# Patient Record
Sex: MALE | Race: WHITE | NOT HISPANIC OR LATINO | Employment: FULL TIME | URBAN - METROPOLITAN AREA
[De-identification: names, ages, dates, MRNs, and addresses within clinical notes are randomized per-mention and may not be internally consistent; named-entity substitution may affect disease eponyms.]

---

## 2022-02-24 NOTE — TELEPHONE ENCOUNTER
DIAGNOSIS: LEFT ankle pain / SELF / HP / No current imaging   APPOINTMENT DATE: 3.1.22   NOTES STATUS DETAILS

## 2022-02-28 DIAGNOSIS — M25.579 PAIN IN JOINT, ANKLE AND FOOT: Primary | ICD-10-CM

## 2022-03-01 ENCOUNTER — OFFICE VISIT (OUTPATIENT)
Dept: ORTHOPEDICS | Facility: CLINIC | Age: 27
End: 2022-03-01
Payer: COMMERCIAL

## 2022-03-01 ENCOUNTER — PRE VISIT (OUTPATIENT)
Dept: ORTHOPEDICS | Facility: CLINIC | Age: 27
End: 2022-03-01
Payer: COMMERCIAL

## 2022-03-01 ENCOUNTER — ANCILLARY PROCEDURE (OUTPATIENT)
Dept: GENERAL RADIOLOGY | Facility: CLINIC | Age: 27
End: 2022-03-01
Attending: FAMILY MEDICINE
Payer: COMMERCIAL

## 2022-03-01 VITALS — HEIGHT: 64 IN | WEIGHT: 150 LBS | BODY MASS INDEX: 25.61 KG/M2

## 2022-03-01 DIAGNOSIS — M25.579 PAIN IN JOINT, ANKLE AND FOOT: ICD-10-CM

## 2022-03-01 DIAGNOSIS — M76.72 PERONEAL TENDINITIS OF LEFT LOWER EXTREMITY: Primary | ICD-10-CM

## 2022-03-01 DIAGNOSIS — M24.272 LIGAMENTOUS LAXITY OF LEFT ANKLE: ICD-10-CM

## 2022-03-01 PROCEDURE — 99203 OFFICE O/P NEW LOW 30 MIN: CPT | Performed by: FAMILY MEDICINE

## 2022-03-01 PROCEDURE — 73610 X-RAY EXAM OF ANKLE: CPT | Mod: LT | Performed by: RADIOLOGY

## 2022-03-01 NOTE — LETTER
"  3/1/2022      RE: Ramon Donavan Berta  [address]  [address]  [place] [address]       ASSESSMENT/PLAN:    (M76.72) Peroneal tendinitis of left lower extremity  (primary encounter diagnosis)  Comment:   Plan: exam was essentially normal today except for some ATFL laxity;  pain was previously localized to lateral ligaments and peroneal tendons; handout given for strengthening/ proprioception; if his symptoms return, I will place an order for PT    (M24.272) Ligamentous laxity of left ankle  Comment:   Plan: see above    Jah Galan MD  March 1, 2022  3:42 PM        Pt is a 26 year old male here today for:     Today, the patient reports that the left ankle pain started about 2 weeks ago after n[place]ic skiing. He reports that he went [place] skiing 3 x's that week for about 1.5-2 hours at a time. Denies any acute trauma, but pain after skiing activity. He did have some initial swelling that has now improved. He has not been skiing since. He does enjoy weight lifting. He would like to ski 1-2 x's/week.   Is a novice learning traditional nordic skiing; no acute injury; thought it was muscle soreness but then was limping  +hx of previous ankle sprains on that side     Left Foot/Ankle pain:   Location: posterolateral ankle   Duration: 2 weeks   Trauma/ Fall? None   Able to walk? Yes, but pain with walking.   Swelling? improved   Bruising? No  Numbness/ Tingling? No   Weakness? None   Instability? None  Snapping/Clicking? None  Imaging? Obtained today XR   Treatment? Ace bandage, massage, rest      No past medical history on file.   No past surgical history on file.   No current outpatient medications on file.      Allergies   Allergen Reactions     Penicillins      2004-04-07      ROS:   Gen- no fevers/chills   Rheum - no morning stiffness   Derm - no rash/ redness   Neuro - no numbness, no tingling   Remainder of ROS negative.     Exam:   Ht 1.626 m (5' 4\")   Wt 68 kg (150 lb)   BMI 25.75 kg/m         L " Foot/Ankle:   Inspection: Swelling - No; Bruising - NO   Sensation: intact in peroneal, tibial, sural, and saphenous distribution   ROM: Dorsiflexion - Full; Plantarflexion - Full; Inversion -Full; Eversion - Full;    Strength: 5/5 in all motions; No Pain w/ resisted motions  Bony tenderness: Medial malleolus? - No; Lateral malleolus? - NO; Navicular? - NO; 5th Metatarsal? - NO; Other - NO  Ligaments Tenderness:- ATFL/CFL ?; Deltoid - NO; Syndesmosis - NO; LisFranc - NO  Tendons: Posterior Tibialis - NO; Peroneals - ?; Achilles - NO   Maneuvers: Anterior Drawer - pos; Talar Tilt - Neg; Squeeze - Neg; Hop - Neg; Doherty - intact      Xray ankle - 3/1/2022 at [facility] – [place]    Erich Galan MD

## 2022-03-01 NOTE — LETTER
Date:March 2, 2022      Patient was self referred, no letter generated. Do not send.        [facility] Health Information

## 2022-03-01 NOTE — PROGRESS NOTES
"ASSESSMENT/PLAN:    (M76.72) Peroneal tendinitis of left lower extremity  (primary encounter diagnosis)  Comment:   Plan: exam was essentially normal today except for some ATFL laxity;  pain was previously localized to lateral ligaments and peroneal tendons; handout given for strengthening/ proprioception; if his symptoms return, I will place an order for PT    (M24.272) Ligamentous laxity of left ankle  Comment:   Plan: see above    Jah Galan MD  March 1, 2022  3:42 PM        Pt is a 26 year old male here today for:     Today, the patient reports that the left ankle pain started about 2 weeks ago after nordic skiing. He reports that he went nordic skiing 3 x's that week for about 1.5-2 hours at a time. Denies any acute trauma, but pain after skiing activity. He did have some initial swelling that has now improved. He has not been skiing since. He does enjoy weight lifting. He would like to ski 1-2 x's/week.   Is a novice learning traditional nordic skiing; no acute injury; thought it was muscle soreness but then was limping  +hx of previous ankle sprains on that side     Left Foot/Ankle pain:   Location: posterolateral ankle   Duration: 2 weeks   Trauma/ Fall? None   Able to walk? Yes, but pain with walking.   Swelling? improved   Bruising? No  Numbness/ Tingling? No   Weakness? None   Instability? None  Snapping/Clicking? None  Imaging? Obtained today XR   Treatment? Ace bandage, massage, rest      No past medical history on file.   No past surgical history on file.   No current outpatient medications on file.      Allergies   Allergen Reactions     Penicillins      2004-04-07      ROS:   Gen- no fevers/chills   Rheum - no morning stiffness   Derm - no rash/ redness   Neuro - no numbness, no tingling   Remainder of ROS negative.     Exam:   Ht 1.626 m (5' 4\")   Wt 68 kg (150 lb)   BMI 25.75 kg/m         L Foot/Ankle:   Inspection: Swelling - No; Bruising - NO   Sensation: intact in peroneal, tibial, sural, " and saphenous distribution   ROM: Dorsiflexion - Full; Plantarflexion - Full; Inversion -Full; Eversion - Full;    Strength: 5/5 in all motions; No Pain w/ resisted motions  Bony tenderness: Medial malleolus? - No; Lateral malleolus? - NO; Navicular? - NO; 5th Metatarsal? - NO; Other - NO  Ligaments Tenderness:- ATFL/CFL ?; Deltoid - NO; Syndesmosis - NO; LisFranc - NO  Tendons: Posterior Tibialis - NO; Peroneals - ?; Achilles - NO   Maneuvers: Anterior Drawer - pos; Talar Tilt - Neg; Squeeze - Neg; Hop - Neg; Doherty - intact      [place]ray ankle - 3/1/2022 at [facility] [place]    Neg

## 2022-04-03 ENCOUNTER — HEALTH MAINTENANCE LETTER (OUTPATIENT)
Age: 27
End: 2022-04-03

## 2022-10-03 ENCOUNTER — HEALTH MAINTENANCE LETTER (OUTPATIENT)
Age: 27
End: 2022-10-03

## 2023-05-21 ENCOUNTER — HEALTH MAINTENANCE LETTER (OUTPATIENT)
Age: 28
End: 2023-05-21

## 2024-07-28 ENCOUNTER — HEALTH MAINTENANCE LETTER (OUTPATIENT)
Age: 29
End: 2024-07-28